# Patient Record
Sex: MALE | Race: WHITE | Employment: FULL TIME | URBAN - METROPOLITAN AREA
[De-identification: names, ages, dates, MRNs, and addresses within clinical notes are randomized per-mention and may not be internally consistent; named-entity substitution may affect disease eponyms.]

---

## 2017-09-07 PROBLEM — Z90.81 HISTORY OF SPLENECTOMY: Status: ACTIVE | Noted: 2017-09-07

## 2017-09-07 PROBLEM — J45.909 REACTIVE AIRWAY DISEASE WITHOUT COMPLICATION: Status: ACTIVE | Noted: 2017-09-07

## 2017-09-27 ENCOUNTER — HOSPITAL ENCOUNTER (OUTPATIENT)
Dept: SLEEP MEDICINE | Age: 42
Discharge: HOME | End: 2017-09-27
Payer: COMMERCIAL

## 2017-09-27 PROCEDURE — 95810 POLYSOM 6/> YRS 4/> PARAM: CPT

## 2017-12-13 PROBLEM — M54.50 CHRONIC BILATERAL LOW BACK PAIN WITHOUT SCIATICA: Status: ACTIVE | Noted: 2017-12-13

## 2017-12-13 PROBLEM — G89.29 CHRONIC BILATERAL LOW BACK PAIN WITHOUT SCIATICA: Status: ACTIVE | Noted: 2017-12-13

## 2017-12-13 PROBLEM — D22.9 NEVUS: Status: ACTIVE | Noted: 2017-12-13

## 2017-12-13 PROBLEM — E78.00 PURE HYPERCHOLESTEROLEMIA: Status: ACTIVE | Noted: 2017-12-13

## 2022-03-19 PROBLEM — M54.50 CHRONIC BILATERAL LOW BACK PAIN WITHOUT SCIATICA: Status: ACTIVE | Noted: 2017-12-13

## 2022-03-19 PROBLEM — G89.29 CHRONIC BILATERAL LOW BACK PAIN WITHOUT SCIATICA: Status: ACTIVE | Noted: 2017-12-13

## 2022-03-19 PROBLEM — D22.9 NEVUS: Status: ACTIVE | Noted: 2017-12-13

## 2022-03-19 PROBLEM — E78.00 PURE HYPERCHOLESTEROLEMIA: Status: ACTIVE | Noted: 2017-12-13

## 2022-03-19 PROBLEM — J45.909 REACTIVE AIRWAY DISEASE WITHOUT COMPLICATION: Status: ACTIVE | Noted: 2017-09-07

## 2022-03-19 PROBLEM — Z90.81 HISTORY OF SPLENECTOMY: Status: ACTIVE | Noted: 2017-09-07

## 2024-12-03 ENCOUNTER — OFFICE VISIT (OUTPATIENT)
Age: 49
End: 2024-12-03

## 2024-12-03 VITALS
RESPIRATION RATE: 19 BRPM | SYSTOLIC BLOOD PRESSURE: 136 MMHG | BODY MASS INDEX: 20.8 KG/M2 | DIASTOLIC BLOOD PRESSURE: 56 MMHG | WEIGHT: 179.8 LBS | OXYGEN SATURATION: 97 % | HEIGHT: 78 IN | TEMPERATURE: 98.1 F | HEART RATE: 79 BPM

## 2024-12-03 DIAGNOSIS — J06.9 UPPER RESPIRATORY INFECTION, VIRAL: Primary | ICD-10-CM

## 2024-12-03 ASSESSMENT — ENCOUNTER SYMPTOMS
EYE PAIN: 0
PHOTOPHOBIA: 0
FACIAL SWELLING: 0
CHEST TIGHTNESS: 0
SINUS PAIN: 0
EYE ITCHING: 0
RHINORRHEA: 1
SWOLLEN GLANDS: 0
TROUBLE SWALLOWING: 0
BACK PAIN: 0
WHEEZING: 0
EYE REDNESS: 0
SHORTNESS OF BREATH: 0
SORE THROAT: 0
COUGH: 1
DIARRHEA: 0
VOICE CHANGE: 0
ABDOMINAL PAIN: 0
VOMITING: 0
NAUSEA: 0
SINUS PRESSURE: 0
EYE DISCHARGE: 0

## 2024-12-03 ASSESSMENT — PATIENT HEALTH QUESTIONNAIRE - PHQ9
SUM OF ALL RESPONSES TO PHQ QUESTIONS 1-9: 0
SUM OF ALL RESPONSES TO PHQ QUESTIONS 1-9: 0
2. FEELING DOWN, DEPRESSED OR HOPELESS: NOT AT ALL
SUM OF ALL RESPONSES TO PHQ QUESTIONS 1-9: 0
SUM OF ALL RESPONSES TO PHQ QUESTIONS 1-9: 0
1. LITTLE INTEREST OR PLEASURE IN DOING THINGS: NOT AT ALL
SUM OF ALL RESPONSES TO PHQ9 QUESTIONS 1 & 2: 0

## 2024-12-03 NOTE — PROGRESS NOTES
Mucous membranes are moist.      Pharynx: Oropharynx is clear. Uvula midline. Postnasal drip present. No pharyngeal swelling, oropharyngeal exudate, posterior oropharyngeal erythema or uvula swelling.      Tonsils: No tonsillar exudate or tonsillar abscesses.   Eyes:      General: No scleral icterus.        Right eye: No discharge.         Left eye: No discharge.      Extraocular Movements: Extraocular movements intact.      Conjunctiva/sclera: Conjunctivae normal.   Neck:      Vascular: No carotid bruit.   Cardiovascular:      Rate and Rhythm: Normal rate and regular rhythm.      Heart sounds: Normal heart sounds.   Pulmonary:      Effort: Pulmonary effort is normal. No respiratory distress.      Breath sounds: No stridor. No wheezing, rhonchi or rales.   Chest:      Chest wall: No tenderness.   Musculoskeletal:         General: Normal range of motion.      Cervical back: Normal range of motion. No rigidity or tenderness.   Lymphadenopathy:      Cervical: No cervical adenopathy.      Upper Body:      Right upper body: No supraclavicular adenopathy.      Left upper body: No supraclavicular adenopathy.   Skin:     General: Skin is warm and dry.   Neurological:      General: No focal deficit present.      Mental Status: He is alert and oriented to person, place, and time. Mental status is at baseline.      Cranial Nerves: No cranial nerve deficit.      Sensory: No sensory deficit.      Motor: No weakness.      Coordination: Coordination normal.      Gait: Gait normal.      Deep Tendon Reflexes: Reflexes normal.   Psychiatric:         Mood and Affect: Mood normal.         Behavior: Behavior normal.         Thought Content: Thought content normal.         Judgment: Judgment normal.       ASSESSMENT and PLAN    Chuck was seen today for nasal congestion and headache.    Diagnoses and all orders for this visit:    Upper respiratory infection, viral    Advised patient to continue to take Mucinex/Guaifenesin to help thin

## 2024-12-11 ENCOUNTER — TELEPHONE (OUTPATIENT)
Age: 49
End: 2024-12-11

## 2024-12-11 NOTE — TELEPHONE ENCOUNTER
Call placed to evaluate symptoms reported on 12/3/24. Patient admits to feeling better, took about seven days. Patient with no further c/o at this time.

## 2025-05-06 ENCOUNTER — OFFICE VISIT (OUTPATIENT)
Age: 50
End: 2025-05-06

## 2025-05-06 DIAGNOSIS — Z00.00 BLOOD TESTS FOR ROUTINE GENERAL PHYSICAL EXAMINATION: Primary | ICD-10-CM

## 2025-05-06 NOTE — PROGRESS NOTES
Tiger and lavender vial obtained via 23g needle from the [facility]. Patient tolerated procedure well.

## 2025-05-07 LAB
ALBUMIN SERPL-MCNC: 4.5 G/DL (ref 4.1–5.1)
ALP SERPL-CCNC: 69 IU/L (ref 44–121)
ALT SERPL-CCNC: 14 IU/L (ref 0–44)
AST SERPL-CCNC: 20 IU/L (ref 0–40)
BASOPHILS # BLD AUTO: 0.1 X10E3/UL (ref 0–0.2)
BASOPHILS NFR BLD AUTO: 1 %
BILIRUB SERPL-MCNC: 0.5 MG/DL (ref 0–1.2)
BUN SERPL-MCNC: 15 MG/DL (ref 6–24)
BUN/CREAT SERPL: 19 (ref 9–20)
CALCIUM SERPL-MCNC: 9.3 MG/DL (ref 8.7–10.2)
CHLORIDE SERPL-SCNC: 104 MMOL/L (ref 96–106)
CHOLEST SERPL-MCNC: 272 MG/DL (ref 100–199)
CO2 SERPL-SCNC: 24 MMOL/L (ref 20–29)
CREAT SERPL-MCNC: 0.79 MG/DL (ref 0.76–1.27)
EGFRCR SERPLBLD CKD-EPI 2021: 108 ML/MIN/1.73
EOSINOPHIL # BLD AUTO: 0.2 X10E3/UL (ref 0–0.4)
EOSINOPHIL NFR BLD AUTO: 2 %
ERYTHROCYTE [DISTWIDTH] IN BLOOD BY AUTOMATED COUNT: 13.1 % (ref 11.6–15.4)
GLOBULIN SER CALC-MCNC: 2.4 G/DL (ref 1.5–4.5)
GLUCOSE SERPL-MCNC: 92 MG/DL (ref 70–99)
HBA1C MFR BLD: 5.4 % (ref 4.8–5.6)
HCT VFR BLD AUTO: 42.2 % (ref 37.5–51)
HDLC SERPL-MCNC: 78 MG/DL
HGB BLD-MCNC: 14.2 G/DL (ref 13–17.7)
IMM GRANULOCYTES # BLD AUTO: 0 X10E3/UL (ref 0–0.1)
IMM GRANULOCYTES NFR BLD AUTO: 0 %
LDLC SERPL CALC-MCNC: 179 MG/DL (ref 0–99)
LYMPHOCYTES # BLD AUTO: 3.7 X10E3/UL (ref 0.7–3.1)
LYMPHOCYTES NFR BLD AUTO: 43 %
MCH RBC QN AUTO: 30.9 PG (ref 26.6–33)
MCHC RBC AUTO-ENTMCNC: 33.6 G/DL (ref 31.5–35.7)
MCV RBC AUTO: 92 FL (ref 79–97)
MONOCYTES # BLD AUTO: 0.9 X10E3/UL (ref 0.1–0.9)
MONOCYTES NFR BLD AUTO: 10 %
NEUTROPHILS # BLD AUTO: 3.8 X10E3/UL (ref 1.4–7)
NEUTROPHILS NFR BLD AUTO: 44 %
PLATELET # BLD AUTO: 294 X10E3/UL (ref 150–450)
POTASSIUM SERPL-SCNC: 4.3 MMOL/L (ref 3.5–5.2)
PROT SERPL-MCNC: 6.9 G/DL (ref 6–8.5)
PSA SERPL-MCNC: 0.8 NG/ML (ref 0–4)
RBC # BLD AUTO: 4.6 X10E6/UL (ref 4.14–5.8)
SODIUM SERPL-SCNC: 141 MMOL/L (ref 134–144)
T3FREE SERPL-MCNC: 3.3 PG/ML (ref 2–4.4)
T4 FREE SERPL-MCNC: 1.18 NG/DL (ref 0.82–1.77)
TRIGL SERPL-MCNC: 90 MG/DL (ref 0–149)
TSH SERPL DL<=0.005 MIU/L-ACNC: 5.2 UIU/ML (ref 0.45–4.5)
VLDLC SERPL CALC-MCNC: 15 MG/DL (ref 5–40)
WBC # BLD AUTO: 8.7 X10E3/UL (ref 3.4–10.8)

## 2025-05-28 ENCOUNTER — OFFICE VISIT (OUTPATIENT)
Age: 50
End: 2025-05-28

## 2025-05-28 VITALS
HEART RATE: 74 BPM | BODY MASS INDEX: 20.83 KG/M2 | OXYGEN SATURATION: 96 % | DIASTOLIC BLOOD PRESSURE: 64 MMHG | SYSTOLIC BLOOD PRESSURE: 116 MMHG | WEIGHT: 180 LBS | HEIGHT: 78 IN | TEMPERATURE: 97.9 F | RESPIRATION RATE: 16 BRPM

## 2025-05-28 DIAGNOSIS — H00.14 CHALAZION OF LEFT UPPER EYELID: Primary | ICD-10-CM

## 2025-05-28 ASSESSMENT — ENCOUNTER SYMPTOMS
COUGH: 0
PHOTOPHOBIA: 0
SHORTNESS OF BREATH: 0
FOREIGN BODY SENSATION: 0
RHINORRHEA: 0
ABDOMINAL PAIN: 0
SINUS PRESSURE: 0
NAUSEA: 0
EYE REDNESS: 1
DOUBLE VISION: 0
EYE PAIN: 0
EYE DISCHARGE: 0
EYE ITCHING: 0
BLURRED VISION: 0